# Patient Record
Sex: FEMALE | URBAN - METROPOLITAN AREA
[De-identification: names, ages, dates, MRNs, and addresses within clinical notes are randomized per-mention and may not be internally consistent; named-entity substitution may affect disease eponyms.]

---

## 2017-05-20 ENCOUNTER — IMPORTED ENCOUNTER (OUTPATIENT)
Dept: URBAN - METROPOLITAN AREA CLINIC 1 | Facility: CLINIC | Age: 72
End: 2017-05-20

## 2017-05-20 PROBLEM — H04.123: Noted: 2017-05-20

## 2017-05-20 PROBLEM — H43.813: Noted: 2017-05-20

## 2017-05-20 PROBLEM — H35.372: Noted: 2017-05-20

## 2017-05-20 PROBLEM — H16.143: Noted: 2017-05-20

## 2017-05-20 PROBLEM — H10.45: Noted: 2017-05-20

## 2017-05-20 PROBLEM — E11.9: Noted: 2017-05-20

## 2017-05-20 PROBLEM — H25.813: Noted: 2017-05-20

## 2017-05-20 PROBLEM — Z79.84: Noted: 2017-05-20

## 2017-05-20 PROCEDURE — 92014 COMPRE OPH EXAM EST PT 1/>: CPT

## 2017-05-20 PROCEDURE — 92015 DETERMINE REFRACTIVE STATE: CPT

## 2017-05-20 NOTE — PATIENT DISCUSSION
1.  DM Type II without sign of diabetic retinopathy and no blot heme on dilated retinal examination today OU No Macular Edema:  Discussed the pathophysiology of diabetes and its effect on the eye and risk of blindness. Stressed the importance of strong glucose control. Advised of importance of at least yearly dilated examinations but to contact us immediately for any problems or concerns. 2. Type II diabetes controlled by oral medications. 3.  Cataract OU:  Visually Significant secondary to glare discussed the risks benefits alternatives and limitations of cataract surgery. The patient stated a full understanding and a desire to proceed with the procedure. The patient will need to return for preop appointment with cataract measurements and to have any additional questions answered and start pre-operative eye drops as directed. Phaco PCL OS then OD (Not MF candidate)Otherwise follow-up 6mo/DFE glare4. TAJ w/ PEK OU-The increase of artificial tears TID OU Routinely were recommended. 5.  Epiretinal Membrane OS - (new) Observe for change. 6. Allergic Conjunctivitis OU -- Patient to start OTC Zaditor OU BID The condition was  discussed with the patient. Avoidance of allergens and cool compresses were recommended. 7. PVD OU -Old/stable. 8.  Return for an appointment for Ascan/HP with Dr. Kristina Mckeon.

## 2017-08-15 ENCOUNTER — IMPORTED ENCOUNTER (OUTPATIENT)
Dept: URBAN - METROPOLITAN AREA CLINIC 1 | Facility: CLINIC | Age: 72
End: 2017-08-15

## 2017-08-15 PROBLEM — E11.9: Noted: 2017-08-15

## 2017-08-15 PROBLEM — H25.813: Noted: 2017-08-15

## 2017-08-15 PROBLEM — H04.123: Noted: 2017-08-15

## 2017-08-15 PROBLEM — H10.45: Noted: 2017-08-15

## 2017-08-15 PROBLEM — H16.143: Noted: 2017-08-15

## 2017-08-15 PROBLEM — H35.372: Noted: 2017-08-15

## 2017-08-15 PROBLEM — H43.813: Noted: 2017-08-15

## 2017-08-15 PROCEDURE — 92012 INTRM OPH EXAM EST PATIENT: CPT

## 2017-08-15 PROCEDURE — 92136 OPHTHALMIC BIOMETRY: CPT

## 2017-08-15 NOTE — PATIENT DISCUSSION
1.  Cataract OU:  Visually Significant secondary to glare discussed the risks benefits alternatives and limitations of cataract surgery. The patient stated a full understanding and a desire to proceed with the procedure. The patient will need to return for preop appointment with cataract measurements and to have any additional questions answered and start pre-operative eye drops as directed. Proceed w/ phaco PCL OS then ODPt understands they will need glasses post-op to achieve their best corrected vision. 2. TAJ w/ increased PEK OU- Progressed OU despite routinely using tears OU. The continuation of artificial tears were recommended. Placed small Silicone Plugs RLL and LLL:  Risks benefits and alternatives to placing plug was discussed with patient. Patient understands and would like to proceed. Consent was signed. Post op instructions were discussed/given to patient and patient was advised to call our office if any problems arose. 3.  DM Type II without DR OU: Stable. 4. Allergic Conjunctivitis OU- The condition was  discussed with the patient. Avoidance of allergens and cool compresses were recommended. 5. Epiretinal Membrane OS- Stable. Observe for change. 6. PVD OU- Old stable. RD precautions. 7.  Return for an appointment for sx OS with Dr. Refugio Hatfield.

## 2017-08-30 ENCOUNTER — IMPORTED ENCOUNTER (OUTPATIENT)
Dept: URBAN - METROPOLITAN AREA CLINIC 1 | Facility: CLINIC | Age: 72
End: 2017-08-30

## 2017-08-31 ENCOUNTER — IMPORTED ENCOUNTER (OUTPATIENT)
Dept: URBAN - METROPOLITAN AREA CLINIC 1 | Facility: CLINIC | Age: 72
End: 2017-08-31

## 2017-08-31 PROBLEM — Z96.1: Noted: 2017-08-31

## 2017-08-31 PROCEDURE — 99024 POSTOP FOLLOW-UP VISIT: CPT

## 2017-08-31 NOTE — PATIENT DISCUSSION
POD#1 CE/IOL OS (Standard) doing well. Continue all 3 gtts as prescribed and until gone. Use Durezol BID OS Acular Qdaily OS Ocuflox TID OS Post op Warnings Reiterated RTC as scheduled

## 2017-09-07 ENCOUNTER — IMPORTED ENCOUNTER (OUTPATIENT)
Dept: URBAN - METROPOLITAN AREA CLINIC 1 | Facility: CLINIC | Age: 72
End: 2017-09-07

## 2017-09-07 PROBLEM — Z96.1: Noted: 2017-09-07

## 2017-09-07 PROBLEM — H25.811: Noted: 2017-09-07

## 2017-09-07 PROCEDURE — 92136 OPHTHALMIC BIOMETRY: CPT

## 2017-09-07 NOTE — PATIENT DISCUSSION
1.  Cataract OD: Visually Significant secondary to glare discussed the risks benefits alternatives and limitations of cataract surgery. The patient stated a full understanding and a desire to proceed with the procedure. The patient will need to start pre-operative eye drops as directed. Proceed w/ phaco PCL ODPt understands they will need glasses post-op to achieve their best corrected vision. 2.  POW#1  CE/IOL Standard OS doing well. Discontinue OcufloxContinue Durezol BID until gone. Continue Bromsite QD until gone. (pt has Acular as well ok for pt to use Acular when Bromsite runs out)3. Sx OD as scheduled w/ PMG.

## 2017-09-13 ENCOUNTER — IMPORTED ENCOUNTER (OUTPATIENT)
Dept: URBAN - METROPOLITAN AREA CLINIC 1 | Facility: CLINIC | Age: 72
End: 2017-09-13

## 2017-09-14 ENCOUNTER — IMPORTED ENCOUNTER (OUTPATIENT)
Dept: URBAN - METROPOLITAN AREA CLINIC 1 | Facility: CLINIC | Age: 72
End: 2017-09-14

## 2017-09-14 PROBLEM — Z96.1: Noted: 2017-09-14

## 2017-09-14 PROCEDURE — 99024 POSTOP FOLLOW-UP VISIT: CPT

## 2017-09-14 NOTE — PATIENT DISCUSSION
1. POD#1 CE/IOL OD doing well. Slight increased IOP OD. Start Alphagan OD BID (Sample given.) Continue all 3 gtts as prescribed and until gone. Ocuflox TIDDurezol BIDBromsite/Acular QDPost op Warnings Reiterated  2. POW#2  CE/IOL OS doing well. Continue Durezol BID until gone. Continue Bromsite/Acular QD until gone. 3.   RTC as scheduled for KR OU w/ PMG

## 2017-10-02 ENCOUNTER — IMPORTED ENCOUNTER (OUTPATIENT)
Dept: URBAN - METROPOLITAN AREA CLINIC 1 | Facility: CLINIC | Age: 72
End: 2017-10-02

## 2017-10-02 PROBLEM — Z96.1: Noted: 2017-10-02

## 2017-10-02 PROCEDURE — 99024 POSTOP FOLLOW-UP VISIT: CPT

## 2017-10-02 NOTE — PATIENT DISCUSSION
POM#1 CE/IOL OU doing well. Pt stopped drops 2 weeks earlier than planned. Recommend pt restart drops OD. D/C Alphagan P Restart Durezol OD BID until gone. Restart Acular OD QD until gone. Return for an appointment for a KR OU in 2 weeks with Dr. Verna Moore.

## 2017-10-30 ENCOUNTER — IMPORTED ENCOUNTER (OUTPATIENT)
Dept: URBAN - METROPOLITAN AREA CLINIC 1 | Facility: CLINIC | Age: 72
End: 2017-10-30

## 2017-10-30 PROBLEM — Z96.1: Noted: 2017-10-30

## 2017-10-30 PROCEDURE — 99024 POSTOP FOLLOW-UP VISIT: CPT

## 2017-10-30 NOTE — PATIENT DISCUSSION
POW#3 Phaco/ PCL OU (Standard OU) with early PCO OD. Otherwise doing well. Finish PO meds per schedule MRX for glasses given Discussed future need for YAG Cap with patient today. Return for an appointment in May 30 with Dr. Guero Larios.

## 2018-05-19 ENCOUNTER — IMPORTED ENCOUNTER (OUTPATIENT)
Dept: URBAN - METROPOLITAN AREA CLINIC 1 | Facility: CLINIC | Age: 73
End: 2018-05-19

## 2018-05-19 PROBLEM — H35.372: Noted: 2018-05-19

## 2018-05-19 PROBLEM — E11.9: Noted: 2018-05-19

## 2018-05-19 PROBLEM — H16.143: Noted: 2018-05-19

## 2018-05-19 PROBLEM — Z96.1: Noted: 2018-05-19

## 2018-05-19 PROBLEM — Z79.84: Noted: 2018-05-19

## 2018-05-19 PROBLEM — H26.491: Noted: 2018-05-19

## 2018-05-19 PROBLEM — H04.123: Noted: 2018-05-19

## 2018-05-19 PROCEDURE — 92014 COMPRE OPH EXAM EST PT 1/>: CPT

## 2018-05-19 NOTE — PATIENT DISCUSSION
1.  DM Type II without sign of diabetic retinopathy and no blot heme on dilated retinal examination today OU No Macular Edema: Stable. Discussed the pathophysiology of diabetes and its effect on the eye and risk of blindness. Stressed the importance of strong glucose control. Advised of importance of at least yearly dilated examinations but to contact us immediately for any problems or concerns. 2. Type II diabetes controlled by oral medications. 3.  Epiretinal Membrane OS- Stable. Appears benign. Observe for change. 4. TAJ w/ decreased PEK OU- Much improved. Plug intact LLL. Plug out RLL. The continuation of artificial tears were recommended. 5.  PCO OD- Observe and consider yag cap when pt feels pco visually significant and visual acuity decreases to appropriate level. 6. PVD OU- Old stable. 7.  Pseudophakia OU- Doing well. 8. Return for an appointment for a 27 in 1 year with Dr. Rosa Jasso.

## 2022-04-02 ASSESSMENT — TONOMETRY
OS_IOP_MMHG: 13
OD_IOP_MMHG: 13
OS_IOP_MMHG: 22
OS_IOP_MMHG: 15
OD_IOP_MMHG: 18
OS_IOP_MMHG: 19
OS_IOP_MMHG: 14
OS_IOP_MMHG: 16
OD_IOP_MMHG: 17
OD_IOP_MMHG: 31
OS_IOP_MMHG: 20
OD_IOP_MMHG: 16
OD_IOP_MMHG: 12
OS_IOP_MMHG: 16
OD_IOP_MMHG: 11

## 2022-04-02 ASSESSMENT — VISUAL ACUITY
OD_CC: 20/40-1
OS_GLARE: 20/80
OD_PH: SC 20/25 -2
OD_CC: 20/30
OS_CC: 20/40
OS_CC: 20/20-1
OS_CC: 20/30
OD_CC: 20/25
OS_CC: 20/30
OS_PH: SC 20/25
OS_SC: 20/25
OD_SC: 20/20
OD_SC: 20/30
OD_CC: 20/40+1
OS_SC: 20/30
OS_CC: 20/40
OS_GLARE: 20/80
OS_CC: 20/40+1
OD_GLARE: 20/100
OS_SC: 20/20
OD_CC: 20/30
OD_SC: 20/20
OD_GLARE: 20/100
OS_CC: 20/30
OD_CC: 20/30
OS_CC: 20/30

## 2022-04-02 ASSESSMENT — KERATOMETRY
OS_AXISANGLE_DEGREES: 157
OD_K1POWER_DIOPTERS: 42.50
OS_AXISANGLE2_DEGREES: 067
OD_AXISANGLE_DEGREES: 171
OS_K1POWER_DIOPTERS: 44.25
OD_AXISANGLE2_DEGREES: 081
OD_K2POWER_DIOPTERS: 41.50
OS_K2POWER_DIOPTERS: 42.25